# Patient Record
Sex: MALE | Race: OTHER | HISPANIC OR LATINO | Employment: OTHER | RURAL
[De-identification: names, ages, dates, MRNs, and addresses within clinical notes are randomized per-mention and may not be internally consistent; named-entity substitution may affect disease eponyms.]

---

## 2021-08-24 LAB — CRC RECOMMENDATION EXT: NORMAL

## 2021-12-23 ENCOUNTER — OFFICE VISIT (OUTPATIENT)
Dept: FAMILY MEDICINE | Facility: CLINIC | Age: 76
End: 2021-12-23
Payer: MEDICARE

## 2021-12-23 VITALS
TEMPERATURE: 98 F | DIASTOLIC BLOOD PRESSURE: 68 MMHG | SYSTOLIC BLOOD PRESSURE: 131 MMHG | OXYGEN SATURATION: 96 % | HEART RATE: 88 BPM | RESPIRATION RATE: 20 BRPM

## 2021-12-23 DIAGNOSIS — R05.9 COUGH: ICD-10-CM

## 2021-12-23 DIAGNOSIS — R51.9 ACUTE NONINTRACTABLE HEADACHE, UNSPECIFIED HEADACHE TYPE: ICD-10-CM

## 2021-12-23 DIAGNOSIS — U07.1 COVID-19: Primary | ICD-10-CM

## 2021-12-23 DIAGNOSIS — R50.9 FEVER, UNSPECIFIED FEVER CAUSE: ICD-10-CM

## 2021-12-23 PROBLEM — I10 ESSENTIAL HYPERTENSION: Status: ACTIVE | Noted: 2021-12-23

## 2021-12-23 PROBLEM — E11.9 DIABETES MELLITUS TYPE 2, NONINSULIN DEPENDENT: Status: ACTIVE | Noted: 2021-11-08

## 2021-12-23 PROBLEM — G89.29 CHRONIC RIGHT SHOULDER PAIN: Status: ACTIVE | Noted: 2021-07-05

## 2021-12-23 PROBLEM — M25.511 CHRONIC RIGHT SHOULDER PAIN: Status: ACTIVE | Noted: 2021-07-05

## 2021-12-23 PROBLEM — E78.2 HYPERLIPIDEMIA, MIXED: Status: ACTIVE | Noted: 2021-12-23

## 2021-12-23 PROBLEM — N40.0 BPH (BENIGN PROSTATIC HYPERPLASIA): Status: ACTIVE | Noted: 2021-12-23

## 2021-12-23 LAB
CTP QC/QA: YES
FLUAV AG NPH QL: NEGATIVE
FLUBV AG NPH QL: NEGATIVE
SARS-COV-2 AG RESP QL IA.RAPID: POSITIVE

## 2021-12-23 PROCEDURE — 99214 OFFICE O/P EST MOD 30 MIN: CPT | Mod: ,,, | Performed by: REGISTERED NURSE

## 2021-12-23 PROCEDURE — 99214 PR OFFICE/OUTPT VISIT, EST, LEVL IV, 30-39 MIN: ICD-10-PCS | Mod: ,,, | Performed by: REGISTERED NURSE

## 2021-12-23 PROCEDURE — 87428 SARSCOV & INF VIR A&B AG IA: CPT | Mod: RHCUB | Performed by: REGISTERED NURSE

## 2021-12-23 RX ORDER — POTASSIUM CITRATE 10 MEQ/1
10 TABLET, EXTENDED RELEASE ORAL 2 TIMES DAILY
COMMUNITY
Start: 2021-12-22

## 2021-12-23 RX ORDER — METFORMIN HYDROCHLORIDE 500 MG/1
500 TABLET, EXTENDED RELEASE ORAL DAILY
COMMUNITY
Start: 2021-12-07

## 2021-12-23 RX ORDER — PROMETHAZINE HYDROCHLORIDE AND DEXTROMETHORPHAN HYDROBROMIDE 6.25; 15 MG/5ML; MG/5ML
5 SYRUP ORAL EVERY 8 HOURS PRN
Qty: 118 ML | Refills: 0 | Status: SHIPPED | OUTPATIENT
Start: 2021-12-23 | End: 2022-01-02

## 2021-12-23 RX ORDER — PANTOPRAZOLE SODIUM 40 MG/1
40 TABLET, DELAYED RELEASE ORAL EVERY MORNING
COMMUNITY
Start: 2021-11-18

## 2021-12-23 RX ORDER — ZOLPIDEM TARTRATE 10 MG/1
10 TABLET ORAL NIGHTLY
COMMUNITY
Start: 2021-12-08

## 2021-12-23 RX ORDER — LISINOPRIL 20 MG/1
20 TABLET ORAL DAILY
COMMUNITY
Start: 2021-12-13

## 2021-12-23 RX ORDER — PREDNISONE 10 MG/1
10 TABLET ORAL DAILY
Qty: 5 TABLET | Refills: 0 | Status: SHIPPED | OUTPATIENT
Start: 2021-12-23 | End: 2021-12-28

## 2021-12-23 RX ORDER — AZITHROMYCIN 250 MG/1
TABLET, FILM COATED ORAL
Qty: 6 TABLET | Refills: 0 | Status: SHIPPED | OUTPATIENT
Start: 2021-12-23 | End: 2021-12-28

## 2021-12-24 ENCOUNTER — INFUSION (OUTPATIENT)
Dept: INFECTIOUS DISEASES | Facility: HOSPITAL | Age: 76
End: 2021-12-24
Attending: REGISTERED NURSE
Payer: MEDICARE

## 2021-12-24 VITALS
HEART RATE: 80 BPM | TEMPERATURE: 98 F | RESPIRATION RATE: 22 BRPM | SYSTOLIC BLOOD PRESSURE: 145 MMHG | DIASTOLIC BLOOD PRESSURE: 84 MMHG

## 2021-12-24 DIAGNOSIS — U07.1 COVID: Primary | ICD-10-CM

## 2021-12-24 PROCEDURE — 25000003 PHARM REV CODE 250: Performed by: REGISTERED NURSE

## 2021-12-24 PROCEDURE — M0243 CASIRIVI AND IMDEVI INFUSION: HCPCS | Performed by: REGISTERED NURSE

## 2021-12-24 PROCEDURE — 63600175 PHARM REV CODE 636 W HCPCS: Performed by: REGISTERED NURSE

## 2021-12-24 RX ORDER — METHYLPREDNISOLONE SOD SUCC 125 MG
40 VIAL (EA) INJECTION ONCE AS NEEDED
Status: DISCONTINUED | OUTPATIENT
Start: 2021-12-24 | End: 2022-10-22

## 2021-12-24 RX ORDER — DIPHENHYDRAMINE HYDROCHLORIDE 50 MG/ML
25 INJECTION INTRAMUSCULAR; INTRAVENOUS ONCE AS NEEDED
Status: DISCONTINUED | OUTPATIENT
Start: 2021-12-24 | End: 2022-10-22

## 2021-12-24 RX ORDER — ACETAMINOPHEN 325 MG/1
650 TABLET ORAL ONCE AS NEEDED
Status: DISCONTINUED | OUTPATIENT
Start: 2021-12-24 | End: 2022-10-22

## 2021-12-24 RX ORDER — ALBUTEROL SULFATE 90 UG/1
2 AEROSOL, METERED RESPIRATORY (INHALATION)
Status: DISCONTINUED | OUTPATIENT
Start: 2021-12-24 | End: 2022-10-22

## 2021-12-24 RX ORDER — ONDANSETRON 4 MG/1
4 TABLET, ORALLY DISINTEGRATING ORAL ONCE AS NEEDED
Status: DISCONTINUED | OUTPATIENT
Start: 2021-12-24 | End: 2022-10-22

## 2021-12-24 RX ORDER — SODIUM CHLORIDE 0.9 % (FLUSH) 0.9 %
10 SYRINGE (ML) INJECTION
Status: DISCONTINUED | OUTPATIENT
Start: 2021-12-24 | End: 2022-10-22

## 2021-12-24 RX ORDER — EPINEPHRINE 0.3 MG/.3ML
0.3 INJECTION SUBCUTANEOUS
Status: DISCONTINUED | OUTPATIENT
Start: 2021-12-24 | End: 2022-10-22

## 2021-12-24 RX ADMIN — CASIRIVIMAB AND IMDEVIMAB 600 MG: 600; 600 INJECTION, SOLUTION, CONCENTRATE INTRAVENOUS at 11:12

## 2022-03-11 DIAGNOSIS — Z71.89 COMPLEX CARE COORDINATION: ICD-10-CM

## 2022-10-09 DIAGNOSIS — Z71.89 COMPLEX CARE COORDINATION: ICD-10-CM

## 2022-10-22 ENCOUNTER — HOSPITAL ENCOUNTER (EMERGENCY)
Facility: HOSPITAL | Age: 77
Discharge: HOME | End: 2022-10-22
Payer: MEDICARE

## 2022-10-22 VITALS
SYSTOLIC BLOOD PRESSURE: 148 MMHG | TEMPERATURE: 99 F | BODY MASS INDEX: 26.21 KG/M2 | OXYGEN SATURATION: 99 % | RESPIRATION RATE: 18 BRPM | WEIGHT: 167 LBS | DIASTOLIC BLOOD PRESSURE: 70 MMHG | HEART RATE: 62 BPM | HEIGHT: 67 IN

## 2022-10-22 DIAGNOSIS — R07.9 CHEST PAIN, UNSPECIFIED TYPE: ICD-10-CM

## 2022-10-22 DIAGNOSIS — M25.511 ACUTE PAIN OF BOTH SHOULDERS: Primary | ICD-10-CM

## 2022-10-22 DIAGNOSIS — M25.512 ACUTE PAIN OF BOTH SHOULDERS: Primary | ICD-10-CM

## 2022-10-22 DIAGNOSIS — R07.9 CHEST PAIN: ICD-10-CM

## 2022-10-22 LAB
ALBUMIN SERPL BCP-MCNC: 3.8 G/DL (ref 3.5–5)
ALBUMIN/GLOB SERPL: 1 {RATIO}
ALP SERPL-CCNC: 101 U/L (ref 45–115)
ALT SERPL W P-5'-P-CCNC: 24 U/L (ref 16–61)
ANION GAP SERPL CALCULATED.3IONS-SCNC: 13 MMOL/L (ref 7–16)
AST SERPL W P-5'-P-CCNC: 16 U/L (ref 15–37)
BASOPHILS # BLD AUTO: 0.03 K/UL (ref 0–0.2)
BASOPHILS NFR BLD AUTO: 0.4 % (ref 0–1)
BILIRUB SERPL-MCNC: 0.4 MG/DL (ref ?–1.2)
BUN SERPL-MCNC: 23 MG/DL (ref 7–18)
BUN/CREAT SERPL: 26 (ref 6–20)
CALCIUM SERPL-MCNC: 8.1 MG/DL (ref 8.5–10.1)
CHLORIDE SERPL-SCNC: 101 MMOL/L (ref 98–107)
CO2 SERPL-SCNC: 26 MMOL/L (ref 21–32)
CREAT SERPL-MCNC: 0.89 MG/DL (ref 0.7–1.3)
DIFFERENTIAL METHOD BLD: ABNORMAL
EGFR (NO RACE VARIABLE) (RUSH/TITUS): 88 ML/MIN/1.73M²
EOSINOPHIL # BLD AUTO: 0.33 K/UL (ref 0–0.5)
EOSINOPHIL NFR BLD AUTO: 4.6 % (ref 1–4)
ERYTHROCYTE [DISTWIDTH] IN BLOOD BY AUTOMATED COUNT: 12.7 % (ref 11.5–14.5)
GLOBULIN SER-MCNC: 4 G/DL (ref 2–4)
GLUCOSE SERPL-MCNC: 112 MG/DL (ref 74–106)
HCT VFR BLD AUTO: 44.9 % (ref 40–54)
HGB BLD-MCNC: 15.2 G/DL (ref 13.5–18)
LYMPHOCYTES # BLD AUTO: 1.75 K/UL (ref 1–4.8)
LYMPHOCYTES NFR BLD AUTO: 24.5 % (ref 27–41)
MCH RBC QN AUTO: 30.3 PG (ref 27–31)
MCHC RBC AUTO-ENTMCNC: 33.9 G/DL (ref 32–36)
MCV RBC AUTO: 89.4 FL (ref 80–96)
MONOCYTES # BLD AUTO: 0.6 K/UL (ref 0–0.8)
MONOCYTES NFR BLD AUTO: 8.4 % (ref 2–6)
MPC BLD CALC-MCNC: 10.8 FL (ref 9.4–12.4)
NEUTROPHILS # BLD AUTO: 4.43 K/UL (ref 1.8–7.7)
NEUTROPHILS NFR BLD AUTO: 62.1 % (ref 53–65)
PLATELET # BLD AUTO: 162 K/UL (ref 150–400)
POTASSIUM SERPL-SCNC: 4.3 MMOL/L (ref 3.5–5.1)
PROT SERPL-MCNC: 7.8 G/DL (ref 6.4–8.2)
RBC # BLD AUTO: 5.02 M/UL (ref 4.6–6.2)
SODIUM SERPL-SCNC: 136 MMOL/L (ref 136–145)
TROPONIN I SERPL HS-MCNC: 10.4 PG/ML
TROPONIN I SERPL HS-MCNC: 9.3 PG/ML
WBC # BLD AUTO: 7.14 K/UL (ref 4.5–11)

## 2022-10-22 PROCEDURE — 80053 COMPREHEN METABOLIC PANEL: CPT | Performed by: NURSE PRACTITIONER

## 2022-10-22 PROCEDURE — 36415 COLL VENOUS BLD VENIPUNCTURE: CPT | Performed by: NURSE PRACTITIONER

## 2022-10-22 PROCEDURE — 93005 ELECTROCARDIOGRAM TRACING: CPT

## 2022-10-22 PROCEDURE — 85025 COMPLETE CBC W/AUTO DIFF WBC: CPT | Performed by: NURSE PRACTITIONER

## 2022-10-22 PROCEDURE — 99285 EMERGENCY DEPT VISIT HI MDM: CPT | Mod: 25

## 2022-10-22 PROCEDURE — 93010 ELECTROCARDIOGRAM REPORT: CPT | Mod: ,,, | Performed by: INTERNAL MEDICINE

## 2022-10-22 PROCEDURE — 99284 EMERGENCY DEPT VISIT MOD MDM: CPT | Mod: GF | Performed by: NURSE PRACTITIONER

## 2022-10-22 PROCEDURE — 84484 ASSAY OF TROPONIN QUANT: CPT | Performed by: NURSE PRACTITIONER

## 2022-10-22 PROCEDURE — 93010 EKG 12-LEAD: ICD-10-PCS | Mod: ,,, | Performed by: INTERNAL MEDICINE

## 2022-10-22 RX ORDER — ASPIRIN 325 MG
325 TABLET ORAL ONCE
Status: DISCONTINUED | OUTPATIENT
Start: 2022-10-22 | End: 2022-10-22

## 2022-10-22 NOTE — ED TRIAGE NOTES
Presents to ed per pov c/o  chest pain and bilateral shoulder pain x 2 weeks that got worse today.Denies n/v ,sob.Reported he took asa 325mg x 2 two hours pta in ed.

## 2022-10-22 NOTE — ED PROVIDER NOTES
Encounter Date: 10/22/2022       History     Chief Complaint   Patient presents with    Chest Pain    Shoulder Pain     X 2 weeks that got worse this am.     77 yr old to ED with c/o intermittent upper chest pain and both shoulders  for the past 2 weeks, states pain worse this morning.  States not as bad now but still has 3/10.  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  Reports yesterday had a dizzy spell and earlier today  felt sweaty.  Denies SOB, nausea/ vomiting.     The history is provided by the patient.   Chest Pain  The current episode started several weeks ago. Chest pain occurs intermittently. The chest pain is unchanged. The quality of the pain is described as aching. The pain radiates to the left shoulder and right shoulder. Primary symptoms include dizziness. Pertinent negatives for primary symptoms include no fever, no fatigue, no shortness of breath, no cough, no palpitations and no nausea.   He describes the dizziness as lightheadedness. The dizziness has been resolved since its onset. Dizziness does not occur with nausea. Treatments tried: aspirin 325 pta. Risk factors include male gender and being elderly.   His past medical history is significant for diabetes.   Review of patient's allergies indicates:  No Known Allergies  Past Medical History:   Diagnosis Date    Diabetes mellitus, type 2     Hypertension      History reviewed. No pertinent surgical history.  History reviewed. No pertinent family history.  Social History     Tobacco Use    Smoking status: Never    Smokeless tobacco: Current   Substance Use Topics    Alcohol use: Never    Drug use: Never     Review of Systems   Constitutional:  Negative for fatigue and fever.   Respiratory:  Negative for cough, chest tightness and shortness of breath.    Cardiovascular:  Positive for chest pain. Negative for palpitations.   Gastrointestinal:  Negative for nausea.   Genitourinary:  Negative  for difficulty urinating.   Musculoskeletal:  Positive for arthralgias.        Bilateral shoulder pain   Skin: Negative.    Neurological:  Positive for dizziness.     Physical Exam     Initial Vitals [10/22/22 1830]   BP Pulse Resp Temp SpO2   (!) 161/77 70 12 98.1 °F (36.7 °C) 97 %      MAP       --         Physical Exam    Nursing note and vitals reviewed.  Constitutional: He appears well-developed and well-nourished.   Neck: Neck supple.   Cardiovascular:  Normal rate and regular rhythm.           Pulmonary/Chest: Breath sounds normal.   Abdominal: Abdomen is soft. There is no abdominal tenderness.   Musculoskeletal:         General: Normal range of motion.      Cervical back: Neck supple.     Neurological: He is alert and oriented to person, place, and time.   Skin: Skin is warm and dry.   Psychiatric: He has a normal mood and affect.       Medical Screening Exam   See Full Note    ED Course   Procedures  Labs Reviewed   COMPREHENSIVE METABOLIC PANEL - Abnormal; Notable for the following components:       Result Value    Glucose 112 (*)     BUN 23 (*)     BUN/Creatinine Ratio 26 (*)     Calcium 8.1 (*)     All other components within normal limits   CBC WITH DIFFERENTIAL - Abnormal; Notable for the following components:    Lymphocytes % 24.5 (*)     Monocytes % 8.4 (*)     Eosinophils % 4.6 (*)     All other components within normal limits   TROPONIN I - Normal   TROPONIN I - Normal   CBC W/ AUTO DIFFERENTIAL    Narrative:     The following orders were created for panel order CBC Auto Differential.  Procedure                               Abnormality         Status                     ---------                               -----------         ------                     CBC with Differential[[id number]]        Abnormal            Final result                 Please view results for these tests on the individual orders.   SARS-COV2 (COVID) W/ FLU ANTIGEN          Imaging Results              X-Ray Chest 1 View  (Final result)  Result time 10/22/22 19:27:32      Final result by Manuelito Waller II, MD (10/22/22 19:27:32)                   Impression:      No evidence of cardiopulmonary disease.      Electronically signed by: Manuelito Waller  Date:    10/22/2022  Time:    19:27               Narrative:    EXAMINATION:  XR CHEST 1 VIEW    CLINICAL HISTORY:  Chest pain, unspecified    COMPARISON:  None available    FINDINGS:  The heart and mediastinum are normal in size and configuration.  The pulmonary vascularity is normal in caliber.  No lung infiltrates, effusions, pneumothorax or other abnormality is demonstrated.                                       Medications - No data to display                ED Course as of 10/22/22 2015   Sat Oct 22, 2022   2008 Reports feels much better.  Discussed findings, f/u and return precautions.  Pt agreed.  [CG]      ED Course User Index  [CG] WANDA Corcoran          Clinical Impression:   Final diagnoses:  [R07.9] Chest pain  [M25.511, M25.512] Acute pain of both shoulders (Primary)  [R07.9] Chest pain, unspecified type        ED Disposition Condition    Discharge Stable          ED Prescriptions    None       Follow-up Information       Follow up With Specialties Details Why Contact Info    [facility] Go in 3 days  [address] [place] [id number]  [phone]               WANDA Corcoran  10/22/22 2015

## 2022-10-23 NOTE — ED NOTES
Patient discharged home with written and verbal instructions. Pt verbalized understanding. All vital signs are WNL. Pt denies any pain or discomfort at this time.

## 2022-10-23 NOTE — DISCHARGE INSTRUCTIONS
Follow up with your primary care provider Monday or Tuesday.  Continue medication as prescribed.  Return immediately for chest pain or concerning symptoms.

## 2023-07-17 ENCOUNTER — INFUSION (OUTPATIENT)
Dept: INFUSION THERAPY | Facility: HOSPITAL | Age: 78
End: 2023-07-17
Attending: STUDENT IN AN ORGANIZED HEALTH CARE EDUCATION/TRAINING PROGRAM
Payer: MEDICARE

## 2023-07-17 ENCOUNTER — OFFICE VISIT (OUTPATIENT)
Dept: FAMILY MEDICINE | Facility: CLINIC | Age: 78
End: 2023-07-17
Payer: MEDICARE

## 2023-07-17 ENCOUNTER — CLINICAL SUPPORT (OUTPATIENT)
Dept: RESPIRATORY THERAPY | Facility: HOSPITAL | Age: 78
End: 2023-07-17
Attending: STUDENT IN AN ORGANIZED HEALTH CARE EDUCATION/TRAINING PROGRAM
Payer: MEDICARE

## 2023-07-17 VITALS
OXYGEN SATURATION: 96 % | HEART RATE: 77 BPM | WEIGHT: 166 LBS | DIASTOLIC BLOOD PRESSURE: 88 MMHG | TEMPERATURE: 98 F | BODY MASS INDEX: 27.66 KG/M2 | SYSTOLIC BLOOD PRESSURE: 149 MMHG | HEIGHT: 65 IN | RESPIRATION RATE: 14 BRPM

## 2023-07-17 VITALS
TEMPERATURE: 99 F | BODY MASS INDEX: 27.66 KG/M2 | SYSTOLIC BLOOD PRESSURE: 122 MMHG | DIASTOLIC BLOOD PRESSURE: 71 MMHG | OXYGEN SATURATION: 96 % | HEIGHT: 65 IN | WEIGHT: 166 LBS | HEART RATE: 98 BPM

## 2023-07-17 DIAGNOSIS — E86.1 HYPOVOLEMIA: Primary | ICD-10-CM

## 2023-07-17 DIAGNOSIS — R00.0 TACHYCARDIA: Primary | ICD-10-CM

## 2023-07-17 DIAGNOSIS — R00.0 TACHYCARDIA: ICD-10-CM

## 2023-07-17 DIAGNOSIS — I95.1 ORTHOSTASIS: ICD-10-CM

## 2023-07-17 DIAGNOSIS — E86.1 HYPOVOLEMIA: ICD-10-CM

## 2023-07-17 PROCEDURE — 93005 ELECTROCARDIOGRAM TRACING: CPT | Mod: RHCUB

## 2023-07-17 PROCEDURE — 99215 PR OFFICE/OUTPT VISIT, EST, LEVL V, 40-54 MIN: ICD-10-PCS | Mod: ,,, | Performed by: STUDENT IN AN ORGANIZED HEALTH CARE EDUCATION/TRAINING PROGRAM

## 2023-07-17 PROCEDURE — 96361 HYDRATE IV INFUSION ADD-ON: CPT

## 2023-07-17 PROCEDURE — 63600175 PHARM REV CODE 636 W HCPCS: Performed by: STUDENT IN AN ORGANIZED HEALTH CARE EDUCATION/TRAINING PROGRAM

## 2023-07-17 PROCEDURE — 99215 OFFICE O/P EST HI 40 MIN: CPT | Mod: ,,, | Performed by: STUDENT IN AN ORGANIZED HEALTH CARE EDUCATION/TRAINING PROGRAM

## 2023-07-17 PROCEDURE — 93010 ELECTROCARDIOGRAM REPORT: CPT | Mod: ,,, | Performed by: INTERNAL MEDICINE

## 2023-07-17 PROCEDURE — 96360 HYDRATION IV INFUSION INIT: CPT

## 2023-07-17 PROCEDURE — 93010 EKG 12-LEAD: ICD-10-PCS | Mod: ,,, | Performed by: INTERNAL MEDICINE

## 2023-07-17 RX ORDER — ACETAMINOPHEN 500 MG
TABLET ORAL DAILY
COMMUNITY

## 2023-07-17 RX ORDER — ASPIRIN 81 MG/1
81 TABLET ORAL DAILY
COMMUNITY

## 2023-07-17 RX ORDER — UBIDECARENONE 75 MG
500 CAPSULE ORAL DAILY
COMMUNITY

## 2023-07-17 RX ORDER — TAMSULOSIN HYDROCHLORIDE 0.4 MG/1
CAPSULE ORAL NIGHTLY
COMMUNITY
End: 2024-01-23

## 2023-07-17 RX ORDER — MAGNESIUM 250 MG
1 TABLET ORAL DAILY
COMMUNITY

## 2023-07-17 RX ADMIN — SODIUM CHLORIDE, POTASSIUM CHLORIDE, SODIUM LACTATE AND CALCIUM CHLORIDE 1000 ML: 600; 310; 30; 20 INJECTION, SOLUTION INTRAVENOUS at 05:07

## 2023-07-17 NOTE — PROGRESS NOTES
Pt resting in bed. Denies any complaints at this time. Reports given to oncoming nurses. TOMI Brown RN and KLEBER Masterson RN.

## 2023-07-17 NOTE — PROGRESS NOTES
"Subjective     Patient ID: Ttae Arora is a 78 y.o. male.    Chief Complaint: Tachycardia (Started today after working in yard)    HPI    78-year-old man with the medical problems listed below who comes to clinic after he began feeling weak after working in the yard. He took his blood pressure, which was mostly normal, but his heart rate was elevated, highest 107. He became concerned and subsequently presented for medical attention. He feels mostly at his baseline otherwise and has maintained adequate po intake with good UOP. He has not had any changes to his diet or medications recently. He will be traveling tomorrow.    He denies any chest pain or dyspnea.       Objective     /71   Pulse 98   Temp 98.8 °F (37.1 °C) (Oral)   Ht 5' 5" (1.651 m)   Wt 75.3 kg (166 lb)   SpO2 96%   BMI 27.62 kg/m²       Physical Exam    Gen: NAD, well-groomed, well-dressed   HEENT: oropharynx benign; no conjunctival pallor or scleral icterus; mucous membranes moist  CV: normal rate, regular rhythm; no m/r/g appreciated  Pulm: CTAB; normal work of breathing  Abdomen: NABS, soft, non tender/non-distended; no hepatosplenomegaly appreciated  Extremities: warm, well-perfused; no peripheral edema  MSK: normal bulk and tone   Skin: warm and dry; no suspicious lesions  Neuro: CN II-XII grossly normal; no focal deficits; awake and alert; gait normal  Psych: pleasant affect; judgment/insight intact       Assessment and Plan     Problem List Items Addressed This Visit       Hypovolemia     Other Visit Diagnoses       Tachycardia    -  Primary    Relevant Orders    IN OFFICE EKG 12-LEAD (to Muse) (Completed)    EKG 12-lead    Orthostasis              HR slightly fast, regular. EKG performed in clinic showing sinus tachycardia. He has orthostatic BP and is symptomatic when he stands up. Suspect he is slightly volume depleted. Will send to [facility] for LR fluid bolus with repeat EKG after the bolus. Discussed with RN in ED regarding this " plan. Counseled him to hold metformin the next day or two until he is feeling better. Encouraged him to call us tomorrow to let us know how he is feeling.      Face to face encounter time 15 minutes.    Non face to face encounter time 25 minutes.  Reviewing separate obtained history, counseling and educating the patient, family and/or other caregivers, ordering medications, tests or procedures; referring and communicating with other health care professionals; documenting clinical information in the electronic medical record; care coordination; independently interpreting results and communicating results to the patient, family, and/or caregivers.    Total time for visit  40 minutes

## 2023-07-17 NOTE — PROGRESS NOTES
Pt presents to ED from [facility] for outpatient fluids and follow-up EKG. Pt states he was working in his yard most of the morning/afternoon. States he went inside and sat down. Upon standing, felt weak. States he checked his heart rate and was 107. States he continued to check heart rate with no improvement after about 15 minutes. Pt presented to clinic for evaluation. MD Hector sent here for IV fluids for hypovolemia.   Pt ambulatory with steady gait to stretcher. Placed on continuous monitoring and vital signs obtained.   AAOX4. Speaking in full, clear sentences. Airway intact and patent.   Reports history of hypertension and compliance with medications.   IV established at bedside and plan of care reviewed with pt.

## 2023-07-17 NOTE — PROGRESS NOTES
Pt first 500mL fluids complete. Respiratory called for EKG. Pt states he needs to use restroom before he finished fluids. Pt ambulatory with steady gait to restroom. Denies any dizziness at this time.   Respiratory at bedside for EKG.

## 2023-07-18 ENCOUNTER — OFFICE VISIT (OUTPATIENT)
Dept: FAMILY MEDICINE | Facility: CLINIC | Age: 78
End: 2023-07-18
Payer: MEDICARE

## 2023-07-18 DIAGNOSIS — E86.1 HYPOVOLEMIA: Primary | ICD-10-CM

## 2023-07-18 PROCEDURE — 99212 OFFICE O/P EST SF 10 MIN: CPT | Mod: ,,, | Performed by: STUDENT IN AN ORGANIZED HEALTH CARE EDUCATION/TRAINING PROGRAM

## 2023-07-18 PROCEDURE — 99212 PR OFFICE/OUTPT VISIT, EST, LEVL II, 10-19 MIN: ICD-10-PCS | Mod: ,,, | Performed by: STUDENT IN AN ORGANIZED HEALTH CARE EDUCATION/TRAINING PROGRAM

## 2023-07-24 NOTE — PROGRESS NOTES
Subjective     Patient ID: Tate Arora is a 78 y.o. male.    Chief Complaint: Consult (Here to discuss test results            pt. left before vital signs could be taken)    HPI    78-year-old man with the medical problems listed below who comes to clinic for follow-up eval. Feeling much better, back to normal. HR is back at his baseline around 70's. Symptoms completely resolved     Objective     There were no vitals taken for this visit.      Physical Exam    Gen: NAD, well-groomed, well-dressed   HEENT: oropharynx benign; no conjunctival pallor or scleral icterus; mucous membranes moist  CV: normal rate, regular rhythm; no m/r/g appreciated  Pulm: CTAB; normal work of breathing  Abdomen: NABS, soft, non tender/non-distended; no hepatosplenomegaly appreciated  Extremities: warm, well-perfused; no peripheral edema  MSK: normal bulk and tone   Skin: warm and dry; no suspicious lesions  Neuro: CN II-XII grossly normal; no focal deficits; awake and alert; gait normal  Psych: pleasant affect; judgment/insight intact       Assessment and Plan     Problem List Items Addressed This Visit       Hypovolemia - Primary     Hypovolemia resolved after IVF bolus yesterday and symptoms completely resolved; recommended to resume home med regimen. Counseled about his medications and how they may predispose him to dehydration. All questions answered to his satisfaction.

## 2023-08-09 DIAGNOSIS — Z71.89 COMPLEX CARE COORDINATION: ICD-10-CM

## 2023-11-06 ENCOUNTER — OFFICE VISIT (OUTPATIENT)
Dept: FAMILY MEDICINE | Facility: CLINIC | Age: 78
End: 2023-11-06
Payer: MEDICARE

## 2023-11-06 VITALS
HEIGHT: 65 IN | SYSTOLIC BLOOD PRESSURE: 136 MMHG | WEIGHT: 165.63 LBS | BODY MASS INDEX: 27.6 KG/M2 | HEART RATE: 74 BPM | TEMPERATURE: 98 F | DIASTOLIC BLOOD PRESSURE: 78 MMHG | OXYGEN SATURATION: 97 %

## 2023-11-06 DIAGNOSIS — R19.7 DIARRHEA, UNSPECIFIED TYPE: Primary | ICD-10-CM

## 2023-11-06 DIAGNOSIS — E11.9 DIABETES MELLITUS TYPE 2, NONINSULIN DEPENDENT: ICD-10-CM

## 2023-11-06 LAB
ANION GAP SERPL CALCULATED.3IONS-SCNC: 9 MMOL/L (ref 7–16)
BASOPHILS # BLD AUTO: 0.05 K/UL (ref 0–0.2)
BASOPHILS NFR BLD AUTO: 0.8 % (ref 0–1)
BILIRUB UR QL STRIP: NEGATIVE
BUN SERPL-MCNC: 17 MG/DL (ref 7–18)
BUN/CREAT SERPL: 18 (ref 6–20)
CALCIUM SERPL-MCNC: 8.8 MG/DL (ref 8.5–10.1)
CHLORIDE SERPL-SCNC: 102 MMOL/L (ref 98–107)
CLARITY UR: CLEAR
CO2 SERPL-SCNC: 28 MMOL/L (ref 21–32)
COLOR UR: NORMAL
CREAT SERPL-MCNC: 0.93 MG/DL (ref 0.7–1.3)
DIFFERENTIAL METHOD BLD: ABNORMAL
EGFR (NO RACE VARIABLE) (RUSH/TITUS): 84 ML/MIN/1.73M2
EOSINOPHIL # BLD AUTO: 0.16 K/UL (ref 0–0.5)
EOSINOPHIL NFR BLD AUTO: 2.5 % (ref 1–4)
ERYTHROCYTE [DISTWIDTH] IN BLOOD BY AUTOMATED COUNT: 12.9 % (ref 11.5–14.5)
EST. AVERAGE GLUCOSE BLD GHB EST-MCNC: 97 MG/DL
FATTY ACIDS: NORMAL /HPF
GLUCOSE SERPL-MCNC: 104 MG/DL (ref 74–106)
GLUCOSE SERPL-MCNC: 99 MG/DL (ref 70–110)
GLUCOSE UR STRIP-MCNC: NORMAL MG/DL
H. PYLORI STOOL: NEGATIVE
HBA1C MFR BLD HPLC: 5.5 % (ref 4.5–6.6)
HCT VFR BLD AUTO: 42.4 % (ref 40–54)
HGB BLD-MCNC: 14.2 G/DL (ref 13.5–18)
IMM GRANULOCYTES # BLD AUTO: 0.02 K/UL (ref 0–0.04)
IMM GRANULOCYTES NFR BLD: 0.3 % (ref 0–0.4)
KETONES UR STRIP-SCNC: NEGATIVE MG/DL
LEUKOCYTE ESTERASE UR QL STRIP: NEGATIVE
LYMPHOCYTES # BLD AUTO: 1.2 K/UL (ref 1–4.8)
LYMPHOCYTES NFR BLD AUTO: 18.7 % (ref 27–41)
MAGNESIUM SERPL-MCNC: 2.4 MG/DL (ref 1.7–2.3)
MCH RBC QN AUTO: 29.3 PG (ref 27–31)
MCHC RBC AUTO-ENTMCNC: 33.5 G/DL (ref 32–36)
MCV RBC AUTO: 87.4 FL (ref 80–96)
MONOCYTES # BLD AUTO: 0.58 K/UL (ref 0–0.8)
MONOCYTES NFR BLD AUTO: 9 % (ref 2–6)
MPC BLD CALC-MCNC: 11.1 FL (ref 9.4–12.4)
NEUTRAL FATS: NORMAL /HPF
NEUTROPHILS # BLD AUTO: 4.4 K/UL (ref 1.8–7.7)
NEUTROPHILS NFR BLD AUTO: 68.7 % (ref 53–65)
NITRITE UR QL STRIP: NEGATIVE
NRBC # BLD AUTO: 0 X10E3/UL
NRBC, AUTO (.00): 0 %
OCCULT BLOOD: NEGATIVE
PH UR STRIP: 5 PH UNITS
PLATELET # BLD AUTO: 215 K/UL (ref 150–400)
POTASSIUM SERPL-SCNC: 3.6 MMOL/L (ref 3.5–5.1)
PROT UR QL STRIP: NEGATIVE
RBC # BLD AUTO: 4.85 M/UL (ref 4.6–6.2)
RBC # UR STRIP: NEGATIVE /UL
SODIUM SERPL-SCNC: 135 MMOL/L (ref 136–145)
SP GR UR STRIP: 1.01
UROBILINOGEN UR STRIP-ACNC: NORMAL MG/DL
WBC # BLD AUTO: 6.41 K/UL (ref 4.5–11)

## 2023-11-06 PROCEDURE — 80048 BASIC METABOLIC PNL TOTAL CA: CPT | Mod: ,,, | Performed by: CLINICAL MEDICAL LABORATORY

## 2023-11-06 PROCEDURE — 82272 OCCULT BLD FECES 1-3 TESTS: CPT | Mod: ,,, | Performed by: CLINICAL MEDICAL LABORATORY

## 2023-11-06 PROCEDURE — 87427 STOOL ANTIGEN TEST: ICD-10-PCS | Mod: ,,, | Performed by: CLINICAL MEDICAL LABORATORY

## 2023-11-06 PROCEDURE — 81003 URINALYSIS, REFLEX TO URINE CULTURE: ICD-10-PCS | Mod: QW,,, | Performed by: CLINICAL MEDICAL LABORATORY

## 2023-11-06 PROCEDURE — 87338 H. PYLORI ANTIGEN, STOOL: ICD-10-PCS | Mod: ,,, | Performed by: CLINICAL MEDICAL LABORATORY

## 2023-11-06 PROCEDURE — 82962 GLUCOSE BLOOD TEST: CPT | Mod: RHCUB | Performed by: REGISTERED NURSE

## 2023-11-06 PROCEDURE — 99214 PR OFFICE/OUTPT VISIT, EST, LEVL IV, 30-39 MIN: ICD-10-PCS | Mod: ,,, | Performed by: REGISTERED NURSE

## 2023-11-06 PROCEDURE — 81003 URINALYSIS AUTO W/O SCOPE: CPT | Mod: QW,,, | Performed by: CLINICAL MEDICAL LABORATORY

## 2023-11-06 PROCEDURE — 80048 BASIC METABOLIC PANEL: ICD-10-PCS | Mod: ,,, | Performed by: CLINICAL MEDICAL LABORATORY

## 2023-11-06 PROCEDURE — 87899 AGENT NOS ASSAY W/OPTIC: CPT | Mod: 91,,, | Performed by: CLINICAL MEDICAL LABORATORY

## 2023-11-06 PROCEDURE — 85025 COMPLETE CBC W/AUTO DIFF WBC: CPT | Mod: ,,, | Performed by: CLINICAL MEDICAL LABORATORY

## 2023-11-06 PROCEDURE — 83993 CALPROTECTIN, STOOL: ICD-10-PCS | Mod: 90,,, | Performed by: CLINICAL MEDICAL LABORATORY

## 2023-11-06 PROCEDURE — 82705 FATS/LIPIDS FECES QUAL: CPT | Mod: ,,, | Performed by: CLINICAL MEDICAL LABORATORY

## 2023-11-06 PROCEDURE — 82272 OCCULT BLOOD X 1, STOOL: ICD-10-PCS | Mod: ,,, | Performed by: CLINICAL MEDICAL LABORATORY

## 2023-11-06 PROCEDURE — 87045 FECES CULTURE AEROBIC BACT: CPT | Mod: ,,, | Performed by: CLINICAL MEDICAL LABORATORY

## 2023-11-06 PROCEDURE — 82705 FECAL FAT, QUALITATIVE: ICD-10-PCS | Mod: ,,, | Performed by: CLINICAL MEDICAL LABORATORY

## 2023-11-06 PROCEDURE — 87338 HPYLORI STOOL AG IA: CPT | Mod: ,,, | Performed by: CLINICAL MEDICAL LABORATORY

## 2023-11-06 PROCEDURE — 87046 STOOL CULTR AEROBIC BACT EA: CPT | Mod: ,,, | Performed by: CLINICAL MEDICAL LABORATORY

## 2023-11-06 PROCEDURE — 83036 HEMOGLOBIN A1C: ICD-10-PCS | Mod: ,,, | Performed by: CLINICAL MEDICAL LABORATORY

## 2023-11-06 PROCEDURE — 87899 STOOL ANTIGEN TEST: ICD-10-PCS | Mod: 91,,, | Performed by: CLINICAL MEDICAL LABORATORY

## 2023-11-06 PROCEDURE — 87046 STOOL ANTIGEN TEST: ICD-10-PCS | Mod: ,,, | Performed by: CLINICAL MEDICAL LABORATORY

## 2023-11-06 PROCEDURE — 83036 HEMOGLOBIN GLYCOSYLATED A1C: CPT | Mod: ,,, | Performed by: CLINICAL MEDICAL LABORATORY

## 2023-11-06 PROCEDURE — 83735 MAGNESIUM: ICD-10-PCS | Mod: ,,, | Performed by: CLINICAL MEDICAL LABORATORY

## 2023-11-06 PROCEDURE — 83735 ASSAY OF MAGNESIUM: CPT | Mod: ,,, | Performed by: CLINICAL MEDICAL LABORATORY

## 2023-11-06 PROCEDURE — 99214 OFFICE O/P EST MOD 30 MIN: CPT | Mod: ,,, | Performed by: REGISTERED NURSE

## 2023-11-06 PROCEDURE — 83993 ASSAY FOR CALPROTECTIN FECAL: CPT | Mod: 90,,, | Performed by: CLINICAL MEDICAL LABORATORY

## 2023-11-06 PROCEDURE — 87045 CULTURE, STOOL: ICD-10-PCS | Mod: ,,, | Performed by: CLINICAL MEDICAL LABORATORY

## 2023-11-06 PROCEDURE — 85025 CBC WITH DIFFERENTIAL: ICD-10-PCS | Mod: ,,, | Performed by: CLINICAL MEDICAL LABORATORY

## 2023-11-06 PROCEDURE — 87427 SHIGA-LIKE TOXIN AG IA: CPT | Mod: ,,, | Performed by: CLINICAL MEDICAL LABORATORY

## 2023-11-06 RX ORDER — DIPHENOXYLATE HYDROCHLORIDE AND ATROPINE SULFATE 2.5; .025 MG/1; MG/1
1 TABLET ORAL 4 TIMES DAILY PRN
Qty: 20 TABLET | Refills: 0 | Status: SHIPPED | OUTPATIENT
Start: 2023-11-06 | End: 2023-11-11

## 2023-11-06 NOTE — PROGRESS NOTES
[facility]        PATIENT NAME: Tate Arora  : 1945  DATE: 23  MRN: [id number]      Billing Provider: [facility]  Level of Service: [place] OFFICE/OUTPT VISIT, KHOA, ALF IV, 30-39 MIN  Patient PCP Information       Provider PCP Type    [facility]            Reason for Visit / Chief Complaint: Diarrhea (Off and on since last Wednesday; no nausea or vomiting)       Update PCP  Update Chief Complaint         History of Present Illness / Problem Focused Workflow     Diarrhea   This is a new (Symptoms started last Wednesday and have persisted since. Symptoms lessened on Friday-Saturday but started back late Saturday. He denies dietary changes, drinks black tea with Splenda, has been avoiding spicy foods and dairy since symptoms started.) problem. The current episode started in the past 7 days. The problem occurs 5 to 10 times per day (He had only 2-3 episodse on Saturday, had at least 6 episodes yesterday. Has gone several times today.). The problem has been waxing and waning. The stool consistency is described as Watery (He denies noticing bright red bood, black tarry apperance. No mucous. Watery substance has yellow color, odor is mild, no change from normal.). The patient states that diarrhea awakens him from sleep. Associated symptoms include bloating. Pertinent negatives include no abdominal pain, arthralgias, chills, coughing, fever, headaches, increased  flatus, myalgias, sweats, URI, vomiting or weight loss. Associated symptoms comments: He denies abdominal cramping or pain. No nausea or vomiting. He has ordered to take Protonix daily, states he has been taking. He stopped Metformin ER, did not take a dose on  or today. A1C in .8. . There are no known risk factors. He has tried anti-motility drug, bismuth subsalicylate, change of diet and increased fluids (He has tried Immodium AD x 3 doses Saturday/, took one dose of Pepto Bismol this morning.)  for the symptoms. The treatment provided no relief. There is no history of bowel resection, inflammatory bowel disease, irritable bowel syndrome, malabsorption, a recent abdominal surgery or short gut syndrome. History of Peptic Ulcer with H. Pylori. No recent history of abx use or change in medication dosing. Last seen by Internal Medicine in July 2023 for wellness exam. Denies history of constipation.     Review of Systems     Review of Systems   Constitutional:  Negative for chills, fever and weight loss.   Respiratory:  Negative for cough.    Cardiovascular: Negative.    Gastrointestinal:  Positive for bloating, change in bowel habit and diarrhea. Negative for abdominal distention, abdominal pain, anal bleeding, blood in stool, flatus, nausea, rectal pain, vomiting and fecal incontinence.   Musculoskeletal:  Negative for arthralgias and myalgias.   Neurological:  Negative for headaches.      Medical / Social / Family History     Past Medical History:   Diagnosis Date    Diabetes mellitus, type 2     Hypertension     Hypovolemia 7/17/2023     History reviewed. No pertinent surgical history.    Social History  Mr. Tate Arora  reports that he has never smoked. He uses smokeless tobacco. He reports that he does not drink alcohol and does not use drugs.    Family History  Mr. Tate Arora's family history is not on file.    Medications and Allergies     Medications  Outpatient Medications Marked as Taking for the 11/6/23 encounter (Office Visit) with Ramon Walker FNP   Medication Sig Dispense Refill    aspirin (ECOTRIN) 81 MG EC tablet Take 81 mg by mouth once daily.      cholecalciferol, vitamin D3, (D3-2000) 50 mcg (2,000 unit) Cap capsule Take by mouth once daily.      cyanocobalamin 500 MCG tablet Take 500 mcg by mouth once daily.      lisinopriL (PRINIVIL,ZESTRIL) 20 MG tablet Take 20 mg by mouth once daily.      magnesium 250 mg Tab Take 1 tablet by mouth Daily.      metFORMIN (GLUCOPHAGE-XR) 500 MG  ER 24hr tablet Take 500 mg by mouth once daily.      pantoprazole (PROTONIX) 40 MG tablet Take 40 mg by mouth every morning.      potassium citrate (UROCIT-K) 10 mEq (1,080 mg) TbSR Take 10 mEq by mouth 2 (two) times daily.      tamsulosin (FLOMAX) 0.4 mg Cap Take by mouth nightly.      zolpidem (AMBIEN) 10 mg Tab Take 10 mg by mouth nightly.       Allergies  Review of patient's allergies indicates:  No Known Allergies    Physical Examination     Vitals:    11/06/23 0830   BP: 136/78   Pulse: 74   Temp: 98.2 °F (36.8 °C)     Physical Exam  Vitals and nursing note reviewed.   Constitutional:       Appearance: Normal appearance.   HENT:      Head: Normocephalic and atraumatic.      Nose: Nose normal.      Comments: Negative for nasal drainage or congestion. No sore throat. No fever.   Cardiovascular:      Rate and Rhythm: Normal rate and regular rhythm.      Heart sounds: Normal heart sounds.   Pulmonary:      Effort: Pulmonary effort is normal.      Breath sounds: Normal breath sounds.      Comments: Denies presence of cramping or pain in the upper or lower abdomen. No increased belching or flatulence.   Abdominal:      General: Bowel sounds are increased.      Palpations: Abdomen is soft.      Tenderness: There is no abdominal tenderness.      Hernia: No hernia is present.   Musculoskeletal:         General: Normal range of motion.      Cervical back: Normal range of motion.   Skin:     General: Skin is warm and dry.   Neurological:      Mental Status: He is alert and oriented to person, place, and time.        Assessment and Plan (including Health Maintenance)      Problem List  Smart Sets  Document Outside    Plan:   Diarrhea, unspecified type  -     CBC Auto Differential; Future; Expected date: 11/06/2023  -     Basic Metabolic Panel; Future; Expected date: 11/06/2023  -     Magnesium; Future; Expected date: 11/06/2023  -     X-Ray Abdomen AP 1 View; Future; Expected date: 11/06/2023  -     POCT Glucose,  Hand-Held Device  -     Urinalysis, Reflex to Urine Culture; Future; Expected date: 11/06/2023  -     H. pylori antigen, stool; Future; Expected date: 11/06/2023  -     Fecal fat, qualitative; Future; Expected date: 11/06/2023  -     Occult blood x 1, stool; Future; Expected date: 11/06/2023  -     Calprotectin, Stool; Future; Expected date: 11/06/2023  -     Stool culture; Future; Expected date: 11/06/2023    Diabetes mellitus type 2, noninsulin dependent  -     Hemoglobin A1C; Future; Expected date: 11/06/2023    Other orders  -     diphenoxylate-atropine 2.5-0.025 mg (LOMOTIL) 2.5-0.025 mg per tablet; Take 1 tablet by mouth 4 (four) times daily as needed for Diarrhea.  Dispense: 20 tablet; Refill: 0    Requesting last colonoscopy results/physician note from [facility]. He indicates last colonoscopy was about 2 years ago. Encouraged dietary changes for the next 2-3 days. Clear liquids today, may progress to BRAT diet in the morning. Encouraged he decrease use of Ibuprofen or aleve. Tylenol as needed for arthritis pain. Hold Metformin ER per last A1C 5.8. He does not monitor blood sugars at home. Denies need to monitor blood sugar. He has refused treatment of high cholesterol in the past, states medication caused leg cramps. Will review lab work and update plan of care as indicated. If symptoms persist, will refer back to [facility] for consult.     Health Maintenance Due   Topic Date Due    Hepatitis C Screening  Never done    TETANUS VACCINE  Never done    Shingles Vaccine (1 of 2) Never done    RSV Vaccine (Age 60+) (1 - 1-dose 60+ series) Never done     Problem List Items Addressed This Visit          Endocrine    Diabetes mellitus type 2, noninsulin dependent    Relevant Orders    Hemoglobin A1C     Other Visit Diagnoses       Diarrhea, unspecified type    -  Primary    Relevant Orders    CBC Auto Differential    Basic Metabolic Panel    Magnesium    X-Ray Abdomen AP 1 View    POCT Glucose, Hand-Held  Device    Urinalysis, Reflex to Urine Culture    H. pylori antigen, stool    Fecal fat, qualitative    Occult blood x 1, stool    Calprotectin, Stool    Stool culture          Health Maintenance Topics with due status: Not Due       Topic Last Completion Date    Lipid Panel 07/11/2023     No future appointments.     Patient Instructions   Hold Metformin XL (Glucophage).    The BRAT diet acronym stands for bananas, rice, applesauce and toast. Bananas, rice, applesauce and toast are easy to digest, and eating these foods will help you hold down food. The fiber found in these foods will also help solidify your stool if you have diarrhea.    Doctors recommend giving your stomach a rest for the first six hours after vomiting or diarrhea, Osvaldo says. Drink small amounts of water frequently to avoid dehydration. Later that day, you can progress to clear liquids -- anything you can see through and sip. Sip clear liquids include things like water, apple juice, flat soda, Jello, weak tea or broth.     If you are tolerating BRAT diet you may begin to incorporate foods from the BRAT diet and other bland foods, like crackers, oatmeal, grits or porridge.    After 1-2 days you can re-introduce soft foods, like soft-cooked eggs, sherbet, cooked vegetables, white meat chicken or fruit, says Osvaldo. Avoid using strong seasonings. Fruits and meats should be cooked down so they are soft and easy to consume.    Eating certain foods too soon may upset your stomach and trigger another round of vomiting or diarrhea. Foods to avoid the first three days after a stomach bug include:    Milk and dairy products  Greasy or spicy foods  Raw vegetables  Cruciferous vegetables, like broccoli, cabbage and [place] sprouts  Citrus fruits, like pineapples, oranges, grapefruits  Berries (or any fruits with seeds)  Alcohol, soda and caffeinated beverages    Follow-up in about one week. If symptoms resolve, you may cancel the appointment.   Follow up in  about 1 week (around 11/13/2023).     Signature:  [facility]    Date of encounter: 11/6/23

## 2023-11-06 NOTE — PATIENT INSTRUCTIONS
Hold Metformin XL (Glucophage).    The BRAT diet acronym stands for bananas, rice, applesauce and toast. Bananas, rice, applesauce and toast are easy to digest, and eating these foods will help you hold down food. The fiber found in these foods will also help solidify your stool if you have diarrhea.    Doctors recommend giving your stomach a rest for the first six hours after vomiting or diarrhea, Osvaldo says. Drink small amounts of water frequently to avoid dehydration. Later that day, you can progress to clear liquids -- anything you can see through and sip. Sip clear liquids include things like water, apple juice, flat soda, Jello, weak tea or broth.     If you are tolerating BRAT diet you may begin to incorporate foods from the BRAT diet and other bland foods, like crackers, oatmeal, grits or porridge.    After 1-2 days you can re-introduce soft foods, like soft-cooked eggs, sherbet, cooked vegetables, white meat chicken or fruit, says Osvaldo. Avoid using strong seasonings. Fruits and meats should be cooked down so they are soft and easy to consume.    Eating certain foods too soon may upset your stomach and trigger another round of vomiting or diarrhea. Foods to avoid the first three days after a stomach bug include:    Milk and dairy products  Greasy or spicy foods  Raw vegetables  Cruciferous vegetables, like broccoli, cabbage and [place] sprouts  Citrus fruits, like pineapples, oranges, grapefruits  Berries (or any fruits with seeds)  Alcohol, soda and caffeinated beverages    Follow-up in about one week. If symptoms resolve, you may cancel the appointment.

## 2023-11-08 ENCOUNTER — TELEPHONE (OUTPATIENT)
Dept: FAMILY MEDICINE | Facility: CLINIC | Age: 78
End: 2023-11-08
Payer: MEDICARE

## 2023-11-08 LAB
CAMPYLOBACTER ANTIGEN: NEGATIVE
EHEC (SHIGA TOXIN 1): NEGATIVE
EHEC (SHIGA TOXIN 2): NEGATIVE

## 2023-11-08 NOTE — TELEPHONE ENCOUNTER
----- Message from WANDA Madera sent at 11/8/2023  4:01 PM CST -----  Please let Mr. Arora know the x-ray of his abdomen does not show any abnormalities. His magnesium level is slightly above normal range. He needs to cut back on his magnesium and take no more than 3 times per week. His A1C is good and his blood counts are within normal range. I am still waiting on 2 of the stool tests I ordered but so far, there is nothing abnormal. Have his symptoms improved?

## 2023-11-09 LAB — SALM+SHIG+E COLI ETEC STL CULT: NORMAL

## 2023-11-10 ENCOUNTER — TELEPHONE (OUTPATIENT)
Dept: FAMILY MEDICINE | Facility: CLINIC | Age: 78
End: 2023-11-10
Payer: MEDICARE

## 2023-11-10 LAB — CALPROTECTIN STL-MCNT: 116 MCG/G

## 2023-11-10 NOTE — TELEPHONE ENCOUNTER
----- Message from WANDA Madera sent at 11/10/2023  1:39 PM CST -----  Please let him know the stool culture came back negative for any abnormal findings.

## 2023-11-21 ENCOUNTER — PATIENT OUTREACH (OUTPATIENT)
Dept: ADMINISTRATIVE | Facility: HOSPITAL | Age: 78
End: 2023-11-21

## 2023-11-21 NOTE — PROGRESS NOTES
Uploaded pt colonoscopy and colon path into [facility]. Pt is to repeat in 5 years  Hx has been updated

## 2024-01-23 ENCOUNTER — HOSPITAL ENCOUNTER (EMERGENCY)
Facility: HOSPITAL | Age: 79
Discharge: HOME | End: 2024-01-23
Payer: MEDICARE

## 2024-01-23 VITALS
OXYGEN SATURATION: 95 % | BODY MASS INDEX: 26.53 KG/M2 | SYSTOLIC BLOOD PRESSURE: 164 MMHG | RESPIRATION RATE: 18 BRPM | HEART RATE: 75 BPM | TEMPERATURE: 98 F | DIASTOLIC BLOOD PRESSURE: 90 MMHG | WEIGHT: 169 LBS | HEIGHT: 67 IN

## 2024-01-23 DIAGNOSIS — N13.2 HYDRONEPHROSIS CONCURRENT WITH AND DUE TO CALCULI OF KIDNEY AND URETER: ICD-10-CM

## 2024-01-23 DIAGNOSIS — N20.1 RIGHT URETERAL STONE: Primary | ICD-10-CM

## 2024-01-23 LAB
ALBUMIN SERPL BCP-MCNC: 3.7 G/DL (ref 3.5–5)
ALBUMIN/GLOB SERPL: 0.9 {RATIO}
ALP SERPL-CCNC: 143 U/L (ref 45–115)
ALT SERPL W P-5'-P-CCNC: 33 U/L (ref 16–61)
ANION GAP SERPL CALCULATED.3IONS-SCNC: 17 MMOL/L (ref 7–16)
AST SERPL W P-5'-P-CCNC: 23 U/L (ref 15–37)
BACTERIA #/AREA URNS HPF: NORMAL /HPF
BASOPHILS # BLD AUTO: 0.01 K/UL (ref 0–0.2)
BASOPHILS NFR BLD AUTO: 0.1 % (ref 0–1)
BILIRUB SERPL-MCNC: 0.3 MG/DL (ref ?–1.2)
BILIRUB UR QL STRIP: NEGATIVE
BUN SERPL-MCNC: 21 MG/DL (ref 7–18)
BUN/CREAT SERPL: 20 (ref 6–20)
CALCIUM SERPL-MCNC: 8.9 MG/DL (ref 8.5–10.1)
CHLORIDE SERPL-SCNC: 100 MMOL/L (ref 98–107)
CLARITY UR: CLEAR
CO2 SERPL-SCNC: 25 MMOL/L (ref 21–32)
COLOR UR: YELLOW
CREAT SERPL-MCNC: 1.05 MG/DL (ref 0.7–1.3)
DIFFERENTIAL METHOD BLD: ABNORMAL
EGFR (NO RACE VARIABLE) (RUSH/TITUS): 73 ML/MIN/1.73M2
EOSINOPHIL # BLD AUTO: 0.12 K/UL (ref 0–0.5)
EOSINOPHIL NFR BLD AUTO: 1.8 % (ref 1–4)
ERYTHROCYTE [DISTWIDTH] IN BLOOD BY AUTOMATED COUNT: 12.5 % (ref 11.5–14.5)
GLOBULIN SER-MCNC: 4 G/DL (ref 2–4)
GLUCOSE SERPL-MCNC: 144 MG/DL (ref 74–106)
GLUCOSE UR STRIP-MCNC: NEGATIVE MG/DL
HCT VFR BLD AUTO: 41.6 % (ref 40–54)
HGB BLD-MCNC: 13.7 G/DL (ref 13.5–18)
KETONES UR STRIP-SCNC: NEGATIVE MG/DL
LACTATE SERPL-SCNC: 1.8 MMOL/L (ref 0.4–2)
LEUKOCYTE ESTERASE UR QL STRIP: NEGATIVE
LYMPHOCYTES # BLD AUTO: 1.26 K/UL (ref 1–4.8)
LYMPHOCYTES NFR BLD AUTO: 18.8 % (ref 27–41)
MCH RBC QN AUTO: 29.5 PG (ref 27–31)
MCHC RBC AUTO-ENTMCNC: 32.9 G/DL (ref 32–36)
MCV RBC AUTO: 89.7 FL (ref 80–96)
MONOCYTES # BLD AUTO: 0.6 K/UL (ref 0–0.8)
MONOCYTES NFR BLD AUTO: 9 % (ref 2–6)
MPC BLD CALC-MCNC: 10.6 FL (ref 9.4–12.4)
NEUTROPHILS # BLD AUTO: 4.71 K/UL (ref 1.8–7.7)
NEUTROPHILS NFR BLD AUTO: 70.3 % (ref 53–65)
NITRITE UR QL STRIP: NEGATIVE
PH UR STRIP: 7 PH UNITS
PLATELET # BLD AUTO: 144 K/UL (ref 150–400)
POTASSIUM SERPL-SCNC: 3.8 MMOL/L (ref 3.5–5.1)
PROT SERPL-MCNC: 7.7 G/DL (ref 6.4–8.2)
PROT UR QL STRIP: 30
RBC # BLD AUTO: 4.64 M/UL (ref 4.6–6.2)
RBC # UR STRIP: ABNORMAL /UL
RBC #/AREA URNS HPF: NORMAL /HPF
SODIUM SERPL-SCNC: 138 MMOL/L (ref 136–145)
SP GR UR STRIP: 1.02
UROBILINOGEN UR STRIP-ACNC: 0.2 MG/DL
WBC # BLD AUTO: 6.7 K/UL (ref 4.5–11)
WBC #/AREA URNS HPF: NORMAL /HPF

## 2024-01-23 PROCEDURE — 85025 COMPLETE CBC W/AUTO DIFF WBC: CPT | Performed by: NURSE PRACTITIONER

## 2024-01-23 PROCEDURE — 63600175 PHARM REV CODE 636 W HCPCS: Performed by: NURSE PRACTITIONER

## 2024-01-23 PROCEDURE — 96376 TX/PRO/DX INJ SAME DRUG ADON: CPT

## 2024-01-23 PROCEDURE — 96374 THER/PROPH/DIAG INJ IV PUSH: CPT

## 2024-01-23 PROCEDURE — 83605 ASSAY OF LACTIC ACID: CPT | Performed by: NURSE PRACTITIONER

## 2024-01-23 PROCEDURE — 99285 EMERGENCY DEPT VISIT HI MDM: CPT | Mod: 25

## 2024-01-23 PROCEDURE — 81003 URINALYSIS AUTO W/O SCOPE: CPT | Performed by: NURSE PRACTITIONER

## 2024-01-23 PROCEDURE — 25500020 PHARM REV CODE 255: Performed by: NURSE PRACTITIONER

## 2024-01-23 PROCEDURE — 99284 EMERGENCY DEPT VISIT MOD MDM: CPT | Mod: GF | Performed by: NURSE PRACTITIONER

## 2024-01-23 PROCEDURE — 80053 COMPREHEN METABOLIC PANEL: CPT | Performed by: NURSE PRACTITIONER

## 2024-01-23 RX ORDER — KETOROLAC TROMETHAMINE 30 MG/ML
15 INJECTION, SOLUTION INTRAMUSCULAR; INTRAVENOUS
Status: COMPLETED | OUTPATIENT
Start: 2024-01-23 | End: 2024-01-23

## 2024-01-23 RX ORDER — ACETAMINOPHEN 500 MG
2000 TABLET ORAL DAILY
COMMUNITY

## 2024-01-23 RX ORDER — PNV NO.95/FERROUS FUM/FOLIC AC 28MG-0.8MG
100 TABLET ORAL DAILY
COMMUNITY

## 2024-01-23 RX ORDER — HYDROCODONE BITARTRATE AND ACETAMINOPHEN 7.5; 325 MG/1; MG/1
1 TABLET ORAL EVERY 6 HOURS PRN
Qty: 12 TABLET | Refills: 0 | Status: SHIPPED | OUTPATIENT
Start: 2024-01-23

## 2024-01-23 RX ORDER — KETOROLAC TROMETHAMINE 10 MG/1
10 TABLET, FILM COATED ORAL EVERY 6 HOURS PRN
Qty: 20 TABLET | Refills: 0 | Status: SHIPPED | OUTPATIENT
Start: 2024-01-23 | End: 2024-01-28

## 2024-01-23 RX ORDER — CHLORHEXIDINE GLUCONATE ORAL RINSE 1.2 MG/ML
15 SOLUTION DENTAL 2 TIMES DAILY
COMMUNITY
Start: 2023-08-15

## 2024-01-23 RX ORDER — TAMSULOSIN HYDROCHLORIDE 0.4 MG/1
0.4 CAPSULE ORAL DAILY
Qty: 10 CAPSULE | Refills: 0 | Status: SHIPPED | OUTPATIENT
Start: 2024-01-23 | End: 2025-01-22

## 2024-01-23 RX ORDER — ONDANSETRON 4 MG/1
4 TABLET, ORALLY DISINTEGRATING ORAL EVERY 6 HOURS PRN
Qty: 15 TABLET | Refills: 0 | Status: SHIPPED | OUTPATIENT
Start: 2024-01-23

## 2024-01-23 RX ADMIN — KETOROLAC TROMETHAMINE 15 MG: 30 INJECTION, SOLUTION INTRAMUSCULAR; INTRAVENOUS at 11:01

## 2024-01-23 RX ADMIN — IOPAMIDOL 100 ML: 755 INJECTION, SOLUTION INTRAVENOUS at 09:01

## 2024-01-23 RX ADMIN — KETOROLAC TROMETHAMINE 15 MG: 30 INJECTION, SOLUTION INTRAMUSCULAR at 09:01

## 2024-01-23 NOTE — DISCHARGE INSTRUCTIONS
Follow up with Dr. Kam in the morning at 09:50.   Nothing to eat or drink after midnight tonight until seen by Dr. Kam.

## 2024-01-23 NOTE — ED NOTES
Pt to ER with c/o right lower quadrant abdominal pain starting about 4 hours ago. Pt reports he is constipated this morning. Pt rated pain 7/10 on pain scale stating it was constant. Afebrile. Ambulatory with steady gate. CASSIE

## 2024-01-23 NOTE — ED PROVIDER NOTES
Encounter Date: 1/23/2024       History     Chief Complaint   Patient presents with    Abdominal Pain     Right lower      77 y/o  male presents pov with c/o RLQ abdominal pain with onset 4 hours PTA to ED. Negative for fever, nausea/vomiting, diarrhea, or constipation. LBM last night with normal stool. States pain radiates from umbilicus to right lower abdomen. Rates pain 7/10.        Review of patient's allergies indicates:  No Known Allergies  Past Medical History:   Diagnosis Date    Diabetes mellitus, type 2     Hypertension     Hypovolemia 07/17/2023    Polyp of colon 08/24/2021     History reviewed. No pertinent surgical history.  History reviewed. No pertinent family history.  Social History     Tobacco Use    Smoking status: Never    Smokeless tobacco: Current   Substance Use Topics    Alcohol use: Never    Drug use: Never     Review of Systems   Constitutional:  Negative for chills and fever.   Respiratory:  Negative for apnea, cough, choking, chest tightness, shortness of breath, wheezing and stridor.    Cardiovascular:  Negative for chest pain, palpitations and leg swelling.   Gastrointestinal:  Positive for abdominal pain. Negative for abdominal distention, anal bleeding, blood in stool, constipation, diarrhea, nausea, rectal pain and vomiting.   Genitourinary:  Negative for decreased urine volume, difficulty urinating, dysuria, enuresis, flank pain, frequency, genital sores, hematuria, penile discharge, penile pain, penile swelling, scrotal swelling, testicular pain and urgency.   Musculoskeletal: Negative.    Skin: Negative.    Neurological: Negative.    Psychiatric/Behavioral: Negative.     All other systems reviewed and are negative.      Physical Exam     Initial Vitals [01/23/24 0816]   BP Pulse Resp Temp SpO2   (!) 172/90 70 18 97.8 °F (36.6 °C) 97 %      MAP       --         Physical Exam    Nursing note and vitals reviewed.  Constitutional: He appears well-developed and well-nourished.  He is not diaphoretic. No distress.   Eyes: Conjunctivae and EOM are normal. No scleral icterus.   Neck: Neck supple.   Normal range of motion.  Cardiovascular:  Normal rate, regular rhythm, normal heart sounds and intact distal pulses.     Exam reveals no gallop and no friction rub.       No murmur heard.  Pulmonary/Chest: Breath sounds normal. No respiratory distress. He has no wheezes. He has no rhonchi. He has no rales. He exhibits no tenderness.   Abdominal: Abdomen is soft. Bowel sounds are normal. He exhibits no distension and no mass. There is abdominal tenderness. There is no rebound and no guarding.   Musculoskeletal:         General: Normal range of motion.      Cervical back: Normal range of motion and neck supple.     Neurological: He is alert and oriented to person, place, and time. He has normal strength.   Skin: Skin is warm and dry. Capillary refill takes less than 2 seconds.   Psychiatric: He has a normal mood and affect. His behavior is normal. Judgment and thought content normal.         Medical Screening Exam   See Full Note    ED Course   Procedures  Labs Reviewed   COMPREHENSIVE METABOLIC PANEL - Abnormal; Notable for the following components:       Result Value    Anion Gap 17 (*)     Glucose 144 (*)     BUN 21 (*)     Alk Phos 143 (*)     All other components within normal limits   CBC WITH DIFFERENTIAL - Abnormal; Notable for the following components:    Platelet Count 144 (*)     Neutrophils % 70.3 (*)     Lymphocytes % 18.8 (*)     Monocytes % 9.0 (*)     All other components within normal limits   URINALYSIS, REFLEX TO URINE CULTURE - Abnormal; Notable for the following components:    Protein, UA 30 (*)     Blood, UA Trace-Intact (*)     All other components within normal limits   LACTIC ACID, PLASMA - Normal   URINALYSIS, MICROSCOPIC - Normal   CBC W/ AUTO DIFFERENTIAL    Narrative:     The following orders were created for panel order CBC auto differential.  Procedure                                Abnormality         Status                     ---------                               -----------         ------                     CBC with Differential[[id number]]       Abnormal            Final result                 Please view results for these tests on the individual orders.          Imaging Results              CT Abdomen Pelvis With IV Contrast NO Oral Contrast (Final result)  Result time 01/23/24 09:55:56      Final result by Manuelito Waller II, MD (01/23/24 09:55:56)                   Impression:      6.8 mm right mid ureter with moderate obstructive changes and delayed contrast enhancement and excretion right kidney suggest a high-grade obstruction.      Electronically signed by: Manuelito Waller  Date:    01/23/2024  Time:    09:55               Narrative:    EXAMINATION:  CT ABDOMEN PELVIS WITH IV CONTRAST    CLINICAL HISTORY:  RLQ abdominal pain (Age >= 14y);    TECHNIQUE:  Axial CT imaging of the abdomen and pelvis is performed with intravenous contrast. Contrast dose is 100 cc Isovue 370.    CT dose reduction technique used - Dose Rite and tube current modulation.    COMPARISON:  None available    FINDINGS:  Cardiac and lung bases are within normal limits    CT abdomen: The liver, spleen, pancreas and adrenal glands are normal in size and enhancement.  No evidence of focal lesion is demonstrated in these solid organs.    There is moderate right hydronephrosis and hydroureter with delayed enhancement and excretion of contrast.  There is a calculus in the right mid ureter that measures 6.8 mm.  A additional nonobstructing calculus in the right kidney and a simple appearing cyst.  The    The bowel caliber is normal and no wall thickening or adjacent inflammatory change is seen.  No evidence of free fluid or free air is present.  Appendix appears normal.    CT pelvis: The pelvic bowel appears within normal limits.  Bladder shows no evidence of abnormality.  The pelvic organs show no  evidence of abnormality.                                       Medications   ketorolac injection 15 mg (15 mg Intravenous Given 1/23/24 0974)   iopamidoL (ISOVUE-370) injection 100 mL (100 mLs Intravenous Given 1/23/24 0949)   ketorolac injection 15 mg (15 mg Intravenous Given 1/23/24 1146)     Medical Decision Making  79 y/o  male presents pov with c/o RLQ abdominal pain with onset 4 hours PTA to ED. Negative for fever, nausea/vomiting, diarrhea, or constipation. LBM last night with normal stool. States pain radiates from umbilicus to right lower abdomen. Rates pain 7/10.      Amount and/or Complexity of Data Reviewed  Labs: ordered.     Details: CBC: WNL  CMP: WNL  Lactate: 1.8  Radiology: ordered.     Details: CT Abd/Pelvis: 6.8 mm right mid ureter with moderate obstructive changes and delayed contrast enhancement and excretion right kidney suggest a high-grade obstruction.               ED Course as of 01/23/24 1213   Tue Jan 23, 2024   1027 WBC, UA: None Seen [NJ]   1033 CT Abd/Pelvis: 6.8 mm right mid ureter with moderate obstructive changes and delayed contrast enhancement and excretion right kidney suggest a high-grade obstruction.  Patient rates pain 2/10 after Toradol. Discussed findings with patient. Patient states he has an established urologist at [facility] but does not remember his name. He will call home to get physicians name. Due to difficulty getting patients transferred plan is to consult with urologist first. [NJ]   1110 Consulted with [facility] and patient information given to . She will relay information to Dr. Kam's nurse and return call. [NJ]   1202 Received call back from hazel at Dr. Kam's office. Okay to discharge home. Appt for 09:50 in the morning at Dr. Kam's office. Patient instructed not to eat or drink tomorrow until seen by Dr. Kam. [NJ]      ED Course User Index  [NJ] Pancho Barros FNP                             Clinical Impression:   Final  diagnoses:  [N20.1] Right ureteral stone (Primary)  [N13.2] Hydronephrosis concurrent with and due to calculi of kidney and ureter        ED Disposition Condition    Discharge Stable          ED Prescriptions       Medication Sig Dispense Start Date End Date Auth. Provider    ketorolac (TORADOL) 10 mg tablet Take 1 tablet (10 mg total) by mouth every 6 (six) hours as needed for Pain. 20 tablet 1/23/2024 1/28/2024 Pancho Barros FNP    tamsulosin (FLOMAX) 0.4 mg Cap Take 1 capsule (0.4 mg total) by mouth once daily. 10 capsule 1/23/2024 1/22/2025 Pancho Barros FNP    ondansetron (ZOFRAN-ODT) 4 MG TbDL Take 1 tablet (4 mg total) by mouth every 6 (six) hours as needed (nausea). 15 tablet 1/23/2024 -- Pancho Barros FNP    HYDROcodone-acetaminophen (NORCO) 7.5-325 mg per tablet Take 1 tablet by mouth every 6 (six) hours as needed for Pain (for breakthrough pain). 12 tablet 1/23/2024 -- Pancho Barros FNP          Follow-up Information       Follow up With Specialties Details Why Contact Sohan Baeza Daily   Appointment for 09:50 on 01/24/2024              Pancho Barros FNP  01/23/24 0426

## 2024-12-27 ENCOUNTER — OFFICE VISIT (OUTPATIENT)
Dept: FAMILY MEDICINE | Facility: CLINIC | Age: 79
End: 2024-12-27
Payer: MEDICARE

## 2024-12-27 VITALS
HEIGHT: 67 IN | SYSTOLIC BLOOD PRESSURE: 136 MMHG | RESPIRATION RATE: 20 BRPM | HEART RATE: 80 BPM | DIASTOLIC BLOOD PRESSURE: 75 MMHG | WEIGHT: 161.13 LBS | BODY MASS INDEX: 25.29 KG/M2 | TEMPERATURE: 98 F | OXYGEN SATURATION: 96 %

## 2024-12-27 DIAGNOSIS — H65.91 RIGHT OTITIS MEDIA WITH EFFUSION: Primary | ICD-10-CM

## 2024-12-27 LAB
CTP QC/QA: YES
MOLECULAR STREP A: NEGATIVE
POC MOLECULAR INFLUENZA A AGN: NEGATIVE
POC MOLECULAR INFLUENZA B AGN: NEGATIVE
SARS-COV-2 RDRP RESP QL NAA+PROBE: NEGATIVE

## 2024-12-27 PROCEDURE — 87635 SARS-COV-2 COVID-19 AMP PRB: CPT | Mod: RHCUB | Performed by: FAMILY MEDICINE

## 2024-12-27 PROCEDURE — 99214 OFFICE O/P EST MOD 30 MIN: CPT | Mod: ,,, | Performed by: FAMILY MEDICINE

## 2024-12-27 PROCEDURE — 87502 INFLUENZA DNA AMP PROBE: CPT | Mod: RHCUB | Performed by: FAMILY MEDICINE

## 2024-12-27 PROCEDURE — 87651 STREP A DNA AMP PROBE: CPT | Mod: RHCUB | Performed by: FAMILY MEDICINE

## 2024-12-27 RX ORDER — CETIRIZINE HYDROCHLORIDE, PSEUDOEPHEDRINE HYDROCHLORIDE 5; 120 MG/1; MG/1
1 TABLET, FILM COATED, EXTENDED RELEASE ORAL 2 TIMES DAILY
Qty: 10 TABLET | Refills: 0 | Status: SHIPPED | OUTPATIENT
Start: 2024-12-27 | End: 2025-01-01

## 2024-12-27 RX ORDER — AMOXICILLIN AND CLAVULANATE POTASSIUM 875; 125 MG/1; MG/1
1 TABLET, FILM COATED ORAL EVERY 12 HOURS
Qty: 14 TABLET | Refills: 0 | Status: SHIPPED | OUTPATIENT
Start: 2024-12-27 | End: 2025-01-03

## 2024-12-27 NOTE — PROGRESS NOTES
Zulay Castillo DO        PATIENT NAME: Tate Arora  : 1945  DATE: 24  MRN: [id number]      Reason for Visit / Chief Complaint: Ear Fullness (Having trouble hearing out of right ear, went to  in [place] on 24 was given zytrec and flonase ,is currently still taken this medication)     History of Present Illness / Problem Focused Workflow     Presents here for right ear fullness which started after a flight trip to [place]   Reports he was seen at a clinic and started on Zyrtec and Flonase  while he was there   Associated with nasal congestion and cough     Review of Systems   Constitutional:  Negative for chills and fever.   HENT:  Positive for ear pain. Negative for congestion and ear discharge.    Respiratory:  Negative for shortness of breath.    Cardiovascular:  Negative for chest pain and palpitations.   Gastrointestinal:  Negative for abdominal pain, nausea and vomiting.   Skin:  Negative for rash.   Neurological:  Negative for dizziness and headaches.        Medical / Social / Family History     Past Medical History:   Diagnosis Date    Diabetes mellitus, type 2     Hypertension     Hypovolemia 2023    Polyp of colon 2021       History reviewed. No pertinent surgical history.    Social History  Mr. aTte Arora  reports that he has never smoked. He has never been exposed to tobacco smoke. He has never used smokeless tobacco. He reports that he does not drink alcohol and does not use drugs.    Family History  Mr. Tate Arora's family history is not on file.    Medications and Allergies     Medications  Outpatient Medications Marked as Taking for the 24 encounter (Office Visit) with Zulay Castillo DO   Medication Sig Dispense Refill    aspirin (ECOTRIN) 81 MG EC tablet Take 81 mg by mouth once daily.      cholecalciferol, vitamin D3, (VITAMIN D3) 50 mcg (2,000 unit) Cap capsule Take 2,000 Units by mouth once daily.      cyanocobalamin  (VITAMIN B-12) 100 MCG tablet Take 100 mcg by mouth once daily.      lisinopriL (PRINIVIL,ZESTRIL) 20 MG tablet Take 20 mg by mouth once daily.      magnesium 250 mg Tab Take 1 tablet by mouth Daily.      potassium citrate (UROCIT-K) 10 mEq (1,080 mg) TbSR Take 10 mEq by mouth 2 (two) times daily.      zolpidem (AMBIEN) 10 mg Tab Take 10 mg by mouth nightly.         Allergies  Review of patient's allergies indicates:  No Known Allergies    Physical Examination     Vitals:    12/27/24 1529   BP: 136/75   Pulse: 80   Resp: 20   Temp: 98.4 °F (36.9 °C)     Physical Exam  Constitutional:       General: He is not in acute distress.     Appearance: Normal appearance.   HENT:      Head: Normocephalic and atraumatic.      Right Ear: A middle ear effusion is present. Tympanic membrane is erythematous.      Left Ear: A middle ear effusion is present.   Cardiovascular:      Rate and Rhythm: Normal rate and regular rhythm.      Heart sounds: No murmur heard.  Pulmonary:      Effort: Pulmonary effort is normal. No respiratory distress.      Breath sounds: Normal breath sounds. No wheezing, rhonchi or rales.   Skin:     General: Skin is warm.   Neurological:      Mental Status: He is alert.   Psychiatric:         Mood and Affect: Mood normal.         Behavior: Behavior normal.       Assessment and Plan (including Health Maintenance)     Plan:   1. Right otitis media with effusion  Negative for COVID, flu, and Strep    Will treat with decongestants and Augmentin   Discussed potential adverse effects associated with Zyrtec D. Patient agreeable to proceed with current plan.   Follow up if symptoms do not improve     Signature:  Zulay Castillo DO      Date of encounter: 12/27/24

## 2025-01-03 ENCOUNTER — OFFICE VISIT (OUTPATIENT)
Dept: FAMILY MEDICINE | Facility: CLINIC | Age: 80
End: 2025-01-03
Payer: MEDICARE

## 2025-01-03 VITALS
RESPIRATION RATE: 18 BRPM | DIASTOLIC BLOOD PRESSURE: 77 MMHG | HEART RATE: 75 BPM | BODY MASS INDEX: 25.14 KG/M2 | SYSTOLIC BLOOD PRESSURE: 129 MMHG | HEIGHT: 67 IN | OXYGEN SATURATION: 97 % | WEIGHT: 160.19 LBS | TEMPERATURE: 99 F

## 2025-01-03 DIAGNOSIS — H65.91 FLUID LEVEL BEHIND TYMPANIC MEMBRANE OF RIGHT EAR: Primary | ICD-10-CM

## 2025-01-03 PROCEDURE — 99213 OFFICE O/P EST LOW 20 MIN: CPT | Mod: ,,, | Performed by: FAMILY MEDICINE

## 2025-01-03 RX ORDER — LORATADINE AND PSEUDOEPHEDRINE SULFATE 5; 120 MG/1; MG/1
1 TABLET, EXTENDED RELEASE ORAL 2 TIMES DAILY
Qty: 20 TABLET | Refills: 0 | COMMUNITY
Start: 2025-01-03 | End: 2025-01-13

## 2025-01-07 NOTE — PROGRESS NOTES
Zulay Castillo DO        PATIENT NAME: Tate Arora  : 1945  DATE: 1/3/25  MRN: [id number]      Reason for Visit / Chief Complaint: Hearing Problem (States right ear is no better since last visit still can not hear out of right ear, has completed medication that was given last visit )     History of Present Illness / Problem Focused Workflow     Presents here for follow-up for otitis media of the right ear.  Patient reports having completed antibiotics.    Does not report having any pain his right ear now however is still having persistent muffling hearing.  Denies any fevers or chills.    Review of Systems   Constitutional:  Negative for chills and fever.   HENT:  Positive for hearing loss. Negative for ear discharge and ear pain.    Respiratory:  Negative for cough, shortness of breath and wheezing.    Cardiovascular:  Negative for chest pain.   Gastrointestinal:  Negative for abdominal pain, nausea and vomiting.   Genitourinary:  Negative for dysuria.   Skin:  Negative for rash.   Neurological:  Negative for dizziness and headaches.   Psychiatric/Behavioral:  Negative for suicidal ideas.        Medical / Social / Family History     Past Medical History:   Diagnosis Date    Diabetes mellitus, type 2     Hypertension     Hypovolemia 2023    Polyp of colon 2021       History reviewed. No pertinent surgical history.    Social History  Mr. Tate Arora  reports that he has never smoked. He has never been exposed to tobacco smoke. He has never used smokeless tobacco. He reports that he does not drink alcohol and does not use drugs.    Family History  Mr. Tate Arora's family history is not on file.    Medications and Allergies     Medications  Outpatient Medications Marked as Taking for the 1/3/25 encounter (Office Visit) with Zulay Castillo DO   Medication Sig Dispense Refill    [] amoxicillin-clavulanate 875-125mg (AUGMENTIN) 875-125 mg per tablet Take 1 tablet by  mouth every 12 (twelve) hours. for 7 days 14 tablet 0    aspirin (ECOTRIN) 81 MG EC tablet Take 81 mg by mouth once daily.      cyanocobalamin (VITAMIN B-12) 100 MCG tablet Take 100 mcg by mouth once daily.      lisinopriL (PRINIVIL,ZESTRIL) 20 MG tablet Take 20 mg by mouth once daily.      magnesium 250 mg Tab Take 1 tablet by mouth Daily.      potassium citrate (UROCIT-K) 10 mEq (1,080 mg) TbSR Take 10 mEq by mouth 2 (two) times daily.      zolpidem (AMBIEN) 10 mg Tab Take 10 mg by mouth nightly.         Allergies  Review of patient's allergies indicates:  No Known Allergies    Physical Examination     Vitals:    01/03/25 1036   BP: 129/77   Pulse: 75   Resp: 18   Temp: 98.5 °F (36.9 °C)     Physical Exam  Constitutional:       General: He is not in acute distress.     Appearance: Normal appearance.   HENT:      Head: Normocephalic and atraumatic.      Right Ear: A middle ear effusion is present.      Left Ear: A middle ear effusion is present.   Cardiovascular:      Rate and Rhythm: Normal rate and regular rhythm.      Heart sounds: No murmur heard.  Pulmonary:      Effort: Pulmonary effort is normal. No respiratory distress.      Breath sounds: Normal breath sounds. No wheezing, rhonchi or rales.   Skin:     General: Skin is warm.   Neurological:      Mental Status: He is alert.   Psychiatric:         Mood and Affect: Mood normal.         Behavior: Behavior normal.         Assessment and Plan (including Health Maintenance)     Plan:   1. Fluid level behind tympanic membrane of right ear  Completed course of antibiotics for otitis media.  Tympanic membrane nonerythematous however there is still fluid behind both ears.  We will treat with short course of oral decongestants.  Discussed potential side effects associated with use of decongestants.  Re-evaluate in 1-2 weeks.  If still persistent, patient reports that he we will speak to his primary care physician about referral to ENT.    Signature:  Zulay BRANDT  DO Jonathan      Date of encounter: 1/3/25